# Patient Record
Sex: FEMALE | Race: WHITE | Employment: OTHER | URBAN - METROPOLITAN AREA
[De-identification: names, ages, dates, MRNs, and addresses within clinical notes are randomized per-mention and may not be internally consistent; named-entity substitution may affect disease eponyms.]

---

## 2020-12-15 ENCOUNTER — OFFICE VISIT (OUTPATIENT)
Dept: OPHTHALMOLOGY | Facility: CLINIC | Age: 75
End: 2020-12-15
Payer: MEDICARE

## 2020-12-15 DIAGNOSIS — H52.223 REGULAR ASTIGMATISM OF BOTH EYES: ICD-10-CM

## 2020-12-15 DIAGNOSIS — H43.813 POSTERIOR VITREOUS DETACHMENT OF BOTH EYES: ICD-10-CM

## 2020-12-15 DIAGNOSIS — G35 MS (MULTIPLE SCLEROSIS) (H): ICD-10-CM

## 2020-12-15 DIAGNOSIS — H52.03 HYPEROPIA OF BOTH EYES: ICD-10-CM

## 2020-12-15 DIAGNOSIS — H25.813 COMBINED FORMS OF AGE-RELATED CATARACT OF BOTH EYES: ICD-10-CM

## 2020-12-15 DIAGNOSIS — H52.4 PRESBYOPIA: ICD-10-CM

## 2020-12-15 DIAGNOSIS — Z01.00 EXAMINATION OF EYES AND VISION: Primary | ICD-10-CM

## 2020-12-15 PROCEDURE — 92015 DETERMINE REFRACTIVE STATE: CPT | Performed by: STUDENT IN AN ORGANIZED HEALTH CARE EDUCATION/TRAINING PROGRAM

## 2020-12-15 PROCEDURE — 92004 COMPRE OPH EXAM NEW PT 1/>: CPT | Performed by: STUDENT IN AN ORGANIZED HEALTH CARE EDUCATION/TRAINING PROGRAM

## 2020-12-15 ASSESSMENT — REFRACTION_WEARINGRX
OS_AXIS: 097
OS_CYLINDER: +0.50
SPECS_TYPE: PAL
OD_CYLINDER: +0.50
OD_AXIS: 097
OS_SPHERE: +1.75
OD_ADD: +3.00
OD_SPHERE: +1.75
OS_ADD: +3.00

## 2020-12-15 ASSESSMENT — REFRACTION_MANIFEST
OS_CYLINDER: +0.25
OD_AXIS: 121
OD_CYLINDER: +0.50
OS_AXIS: 118
OS_ADD: +2.75
OS_SPHERE: +2.00
OD_SPHERE: +1.75
OD_ADD: +2.75

## 2020-12-15 ASSESSMENT — VISUAL ACUITY
OD_CC+: -1
CORRECTION_TYPE: GLASSES
OS_CC: 20/30
OS_CC+: -2
METHOD: SNELLEN - LINEAR
OD_CC: 20/30

## 2020-12-15 ASSESSMENT — TONOMETRY
OS_IOP_MMHG: 19
IOP_METHOD: APPLANATION
OD_IOP_MMHG: 17

## 2020-12-15 ASSESSMENT — CUP TO DISC RATIO
OD_RATIO: 0.3
OS_RATIO: 0.35

## 2020-12-15 ASSESSMENT — CONF VISUAL FIELD
METHOD: COUNTING FINGERS
OD_NORMAL: 1
OS_NORMAL: 1

## 2020-12-15 ASSESSMENT — EXTERNAL EXAM - LEFT EYE: OS_EXAM: NORMAL

## 2020-12-15 ASSESSMENT — SLIT LAMP EXAM - LIDS
COMMENTS: NORMAL
COMMENTS: NORMAL

## 2020-12-15 ASSESSMENT — EXTERNAL EXAM - RIGHT EYE: OD_EXAM: NORMAL

## 2020-12-15 NOTE — PROGRESS NOTES
Current Eye Medications:  None     Subjective:  Complete eye exam. Vision is doing well distance and near both eyes. No eye pain or discomfort in either eye.     Diagnosed with MS in 2006.  FH mom: glaucoma. Sisters with macular degeneration.     Objective:  See Ophthalmology Exam.       Assessment:  Chelsea Frazier is a 75 year old female who presents with:   Encounter Diagnoses   Name Primary?     Examination of eyes and vision      Hyperopia of both eyes      Regular astigmatism of both eyes      Presbyopia        Combined forms of age-related cataract of both eyes Not visually significant      Posterior vitreous detachment of both eyes      MS (multiple sclerosis) (H)        Plan:  Glasses prescription given - optional to update    Anna Smith MD  [phone]

## 2020-12-15 NOTE — LETTER
12/15/2020         RE: Chelsea Frazier  [address] [facility] [id number]        Dear Colleague,    Thank you for referring your patient, Chelsea Frazier, to the [facility]. Please see a copy of my visit note below.     Current Eye Medications:  None     Subjective:  Complete eye exam. Vision is doing well distance and near both eyes. No eye pain or discomfort in either eye.     Diagnosed with MS in 2006.  FH mom: glaucoma. Sisters with macular degeneration.     Objective:  See Ophthalmology Exam.       Assessment:  Chelsea Frazier is a 75 year old female who presents with:   Encounter Diagnoses   Name Primary?     Examination of eyes and vision      Hyperopia of both eyes      Regular astigmatism of both eyes      Presbyopia        Combined forms of age-related cataract of both eyes Not visually significant      Posterior vitreous detachment of both eyes      MS (multiple sclerosis) (H)        Plan:  Glasses prescription given - optional to update    Anna Smith MD  [phone]          Again, thank you for allowing me to participate in the care of your patient.        Sincerely,        Anna Smith MD

## 2025-06-20 ENCOUNTER — OFFICE VISIT (OUTPATIENT)
Dept: OPHTHALMOLOGY | Facility: CLINIC | Age: 80
End: 2025-06-20
Payer: MEDICARE

## 2025-06-20 DIAGNOSIS — H25.13 NUCLEAR SCLEROSIS OF BOTH EYES: ICD-10-CM

## 2025-06-20 DIAGNOSIS — H52.03 HYPEROPIA OF BOTH EYES WITH ASTIGMATISM AND PRESBYOPIA: ICD-10-CM

## 2025-06-20 DIAGNOSIS — H52.4 HYPEROPIA OF BOTH EYES WITH ASTIGMATISM AND PRESBYOPIA: ICD-10-CM

## 2025-06-20 DIAGNOSIS — H43.813 POSTERIOR VITREOUS DETACHMENT OF BOTH EYES: ICD-10-CM

## 2025-06-20 DIAGNOSIS — H52.203 HYPEROPIA OF BOTH EYES WITH ASTIGMATISM AND PRESBYOPIA: ICD-10-CM

## 2025-06-20 DIAGNOSIS — H25.813 COMBINED FORMS OF AGE-RELATED CATARACT OF BOTH EYES: ICD-10-CM

## 2025-06-20 DIAGNOSIS — Z01.00 EXAMINATION OF EYES AND VISION: Primary | ICD-10-CM

## 2025-06-20 PROCEDURE — 92004 COMPRE OPH EXAM NEW PT 1/>: CPT | Performed by: STUDENT IN AN ORGANIZED HEALTH CARE EDUCATION/TRAINING PROGRAM

## 2025-06-20 PROCEDURE — 92015 DETERMINE REFRACTIVE STATE: CPT | Mod: GY | Performed by: STUDENT IN AN ORGANIZED HEALTH CARE EDUCATION/TRAINING PROGRAM

## 2025-06-20 RX ORDER — ALENDRONATE SODIUM 70 MG/1
TABLET ORAL
COMMUNITY
Start: 2024-09-10

## 2025-06-20 RX ORDER — ACETAMINOPHEN 500 MG
TABLET ORAL
COMMUNITY

## 2025-06-20 ASSESSMENT — VISUAL ACUITY
OD_PH_CC: 20/30
OD_CC+: -2
OS_CC: 20/25
OS_CC: J1+
OD_CC: J1
OD_PH_CC+: -2
CORRECTION_TYPE: GLASSES
METHOD: SNELLEN - LINEAR
OD_CC: 20/40

## 2025-06-20 ASSESSMENT — CONF VISUAL FIELD
OD_SUPERIOR_TEMPORAL_RESTRICTION: 0
OS_INFERIOR_TEMPORAL_RESTRICTION: 0
OD_INFERIOR_NASAL_RESTRICTION: 0
OD_NORMAL: 1
OS_INFERIOR_NASAL_RESTRICTION: 0
OD_SUPERIOR_NASAL_RESTRICTION: 0
METHOD: COUNTING FINGERS
OS_SUPERIOR_TEMPORAL_RESTRICTION: 0
OS_SUPERIOR_NASAL_RESTRICTION: 0
OD_INFERIOR_TEMPORAL_RESTRICTION: 0
OS_NORMAL: 1

## 2025-06-20 ASSESSMENT — REFRACTION_WEARINGRX
OS_CYLINDER: +0.50
OS_SPHERE: +1.75
OD_AXIS: 097
SPECS_TYPE: BIFOCAL
OD_CYLINDER: +0.50
OS_AXIS: 097
OD_SPHERE: +1.75
OS_ADD: +3.00
OD_ADD: +3.00

## 2025-06-20 ASSESSMENT — SLIT LAMP EXAM - LIDS
COMMENTS: 1+ BLEPHARITIS
COMMENTS: 1+ BLEPHARITIS

## 2025-06-20 ASSESSMENT — REFRACTION_MANIFEST
OS_AXIS: 108
OD_SPHERE: +2.00
OD_ADD: +3.00
OS_SPHERE: +1.75
OS_CYLINDER: +0.50
OS_ADD: +3.00
OD_CYLINDER: +0.50
OD_AXIS: 101

## 2025-06-20 ASSESSMENT — CUP TO DISC RATIO
OD_RATIO: 0.3
OS_RATIO: 0.35

## 2025-06-20 ASSESSMENT — TONOMETRY
OS_IOP_MMHG: 15
OD_IOP_MMHG: 14
IOP_METHOD: APPLANATION

## 2025-06-20 ASSESSMENT — EXTERNAL EXAM - LEFT EYE: OS_EXAM: NORMAL

## 2025-06-20 ASSESSMENT — EXTERNAL EXAM - RIGHT EYE: OD_EXAM: NORMAL

## 2025-06-20 ASSESSMENT — ENCOUNTER SYMPTOMS: JOINT SWELLING: 1

## 2025-06-20 NOTE — LETTER
6/20/2025      Chelsea Frazier  [address] [place] [id number]      Dear Colleague,    Thank you for referring your patient, Chelsea Frazier, to the [facility]. Please see a copy of my visit note below.     Current Eye Medications:  None     Subjective:  Patient returns to clinic for complete eye exam. She feels her vision has been stable over the past year. No flashes, floaters or distortion. Denies eye pain or discomfort. Has had a lot of knee pain and then she has been dealing with varicose veins as well.    She has not had an exam for five years as she was taking care of her  - he passed away 11/2024    She notes that both of her sisters have AMD and one had dry and one has wet.     Last visit with Dr. Smith 2020.     Objective:  See Ophthalmology Exam.      Assessment:  Chelsea Frazier is a 79 year old female who presents with:   Encounter Diagnoses   Name Primary?     Examination of eyes and vision Yes     Nuclear sclerosis of both eyes      Combined forms of age-related cataract of both eyes      Posterior vitreous detachment of both eyes      Hyperopia of both eyes with astigmatism and presbyopia         Plan:  Glasses prescription given - optional to update    Anna Smith MD  [phone]      Again, thank you for allowing me to participate in the care of your patient.        Sincerely,        Anna Smith MD    Electronically signed

## 2025-06-20 NOTE — PROGRESS NOTES
Current Eye Medications:  None     Subjective:  Patient returns to clinic for complete eye exam. She feels her vision has been stable over the past year. No flashes, floaters or distortion. Denies eye pain or discomfort. Has had a lot of knee pain and then she has been dealing with varicose veins as well.    She has not had an exam for five years as she was taking care of her  - he passed away 11/2024    She notes that both of her sisters have AMD and one had dry and one has wet.     Last visit with Dr. Smith 2020.     Objective:  See Ophthalmology Exam.      Assessment:  Chelsea Frazier is a 79 year old female who presents with:   Encounter Diagnoses   Name Primary?    Examination of eyes and vision Yes    Nuclear sclerosis of both eyes     Combined forms of age-related cataract of both eyes     Posterior vitreous detachment of both eyes     Hyperopia of both eyes with astigmatism and presbyopia         Plan:  Glasses prescription given - optional to update    Anna Smith MD  [phone]

## 2025-06-20 NOTE — PROGRESS NOTES
Current Eye Medications:  ***     Subjective:  comprehensive eye exam - ***.      Objective:  See Ophthalmology Exam.       Assessment:      Plan:   See Patient Instructions.